# Patient Record
Sex: FEMALE | Race: WHITE | NOT HISPANIC OR LATINO | URBAN - METROPOLITAN AREA
[De-identification: names, ages, dates, MRNs, and addresses within clinical notes are randomized per-mention and may not be internally consistent; named-entity substitution may affect disease eponyms.]

---

## 2017-02-14 ENCOUNTER — EMERGENCY (EMERGENCY)
Facility: HOSPITAL | Age: 67
LOS: 1 days | Discharge: HOME | End: 2017-02-14
Attending: EMERGENCY MEDICINE
Payer: SELF-PAY

## 2017-02-14 VITALS
SYSTOLIC BLOOD PRESSURE: 139 MMHG | DIASTOLIC BLOOD PRESSURE: 63 MMHG | HEART RATE: 59 BPM | OXYGEN SATURATION: 95 % | HEIGHT: 61.42 IN | TEMPERATURE: 97 F | WEIGHT: 123.46 LBS | RESPIRATION RATE: 16 BRPM

## 2017-02-14 DIAGNOSIS — Z98.890 OTHER SPECIFIED POSTPROCEDURAL STATES: Chronic | ICD-10-CM

## 2017-02-14 DIAGNOSIS — R11.0 NAUSEA: ICD-10-CM

## 2017-02-14 DIAGNOSIS — M54.5 LOW BACK PAIN: ICD-10-CM

## 2017-02-14 DIAGNOSIS — R10.9 UNSPECIFIED ABDOMINAL PAIN: ICD-10-CM

## 2017-02-14 PROBLEM — Z00.00 ENCOUNTER FOR PREVENTIVE HEALTH EXAMINATION: Status: ACTIVE | Noted: 2017-02-14

## 2017-02-14 LAB
ALBUMIN SERPL ELPH-MCNC: 3.8 G/DL — SIGNIFICANT CHANGE UP (ref 3.5–5)
ALP SERPL-CCNC: 90 U/L — SIGNIFICANT CHANGE UP (ref 40–120)
ALT FLD-CCNC: 22 U/L DA — SIGNIFICANT CHANGE UP (ref 10–60)
ANION GAP SERPL CALC-SCNC: 8 MMOL/L — SIGNIFICANT CHANGE UP (ref 5–17)
APPEARANCE UR: CLEAR — SIGNIFICANT CHANGE UP
APTT BLD: 31.7 SEC — SIGNIFICANT CHANGE UP (ref 27.5–37.4)
AST SERPL-CCNC: 14 U/L — SIGNIFICANT CHANGE UP (ref 10–40)
BASOPHILS # BLD AUTO: 0.1 K/UL — SIGNIFICANT CHANGE UP (ref 0–0.2)
BASOPHILS NFR BLD AUTO: 1.2 % — SIGNIFICANT CHANGE UP (ref 0–2)
BILIRUB SERPL-MCNC: 0.3 MG/DL — SIGNIFICANT CHANGE UP (ref 0.2–1.2)
BILIRUB UR-MCNC: NEGATIVE — SIGNIFICANT CHANGE UP
BUN SERPL-MCNC: 16 MG/DL — SIGNIFICANT CHANGE UP (ref 7–18)
CALCIUM SERPL-MCNC: 8.6 MG/DL — SIGNIFICANT CHANGE UP (ref 8.4–10.5)
CHLORIDE SERPL-SCNC: 110 MMOL/L — HIGH (ref 96–108)
CO2 SERPL-SCNC: 27 MMOL/L — SIGNIFICANT CHANGE UP (ref 22–31)
COLOR SPEC: YELLOW — SIGNIFICANT CHANGE UP
CREAT SERPL-MCNC: 0.83 MG/DL — SIGNIFICANT CHANGE UP (ref 0.5–1.3)
DIFF PNL FLD: NEGATIVE — SIGNIFICANT CHANGE UP
EOSINOPHIL # BLD AUTO: 0.1 K/UL — SIGNIFICANT CHANGE UP (ref 0–0.5)
EOSINOPHIL NFR BLD AUTO: 1.9 % — SIGNIFICANT CHANGE UP (ref 0–6)
GLUCOSE SERPL-MCNC: 113 MG/DL — HIGH (ref 70–99)
GLUCOSE UR QL: NEGATIVE — SIGNIFICANT CHANGE UP
HCT VFR BLD CALC: 42.3 % — SIGNIFICANT CHANGE UP (ref 34.5–45)
HGB BLD-MCNC: 13.7 G/DL — SIGNIFICANT CHANGE UP (ref 11.5–15.5)
INR BLD: 0.95 RATIO — SIGNIFICANT CHANGE UP (ref 0.88–1.16)
KETONES UR-MCNC: NEGATIVE — SIGNIFICANT CHANGE UP
LEUKOCYTE ESTERASE UR-ACNC: NEGATIVE — SIGNIFICANT CHANGE UP
LIDOCAIN IGE QN: 185 U/L — SIGNIFICANT CHANGE UP (ref 73–393)
LYMPHOCYTES # BLD AUTO: 2.2 K/UL — SIGNIFICANT CHANGE UP (ref 1–3.3)
LYMPHOCYTES # BLD AUTO: 33.2 % — SIGNIFICANT CHANGE UP (ref 13–44)
MCHC RBC-ENTMCNC: 30.1 PG — SIGNIFICANT CHANGE UP (ref 27–34)
MCHC RBC-ENTMCNC: 32.4 GM/DL — SIGNIFICANT CHANGE UP (ref 32–36)
MCV RBC AUTO: 92.9 FL — SIGNIFICANT CHANGE UP (ref 80–100)
MONOCYTES # BLD AUTO: 0.3 K/UL — SIGNIFICANT CHANGE UP (ref 0–0.9)
MONOCYTES NFR BLD AUTO: 5.3 % — SIGNIFICANT CHANGE UP (ref 2–14)
NEUTROPHILS # BLD AUTO: 3.8 K/UL — SIGNIFICANT CHANGE UP (ref 1.8–7.4)
NEUTROPHILS NFR BLD AUTO: 58.4 % — SIGNIFICANT CHANGE UP (ref 43–77)
NITRITE UR-MCNC: NEGATIVE — SIGNIFICANT CHANGE UP
PH UR: 6 — SIGNIFICANT CHANGE UP (ref 4.8–8)
PLATELET # BLD AUTO: 279 K/UL — SIGNIFICANT CHANGE UP (ref 150–400)
POTASSIUM SERPL-MCNC: 4.3 MMOL/L — SIGNIFICANT CHANGE UP (ref 3.5–5.3)
POTASSIUM SERPL-SCNC: 4.3 MMOL/L — SIGNIFICANT CHANGE UP (ref 3.5–5.3)
PROT SERPL-MCNC: 7.3 G/DL — SIGNIFICANT CHANGE UP (ref 6–8.3)
PROT UR-MCNC: NEGATIVE — SIGNIFICANT CHANGE UP
PROTHROM AB SERPL-ACNC: 10.6 SEC — SIGNIFICANT CHANGE UP (ref 10–13.1)
RBC # BLD: 4.55 M/UL — SIGNIFICANT CHANGE UP (ref 3.8–5.2)
RBC # FLD: 12.1 % — SIGNIFICANT CHANGE UP (ref 10.3–14.5)
SODIUM SERPL-SCNC: 145 MMOL/L — SIGNIFICANT CHANGE UP (ref 135–145)
SP GR SPEC: 1.01 — SIGNIFICANT CHANGE UP (ref 1.01–1.02)
UROBILINOGEN FLD QL: NEGATIVE — SIGNIFICANT CHANGE UP
WBC # BLD: 6.6 K/UL — SIGNIFICANT CHANGE UP (ref 3.8–10.5)
WBC # FLD AUTO: 6.6 K/UL — SIGNIFICANT CHANGE UP (ref 3.8–10.5)

## 2017-02-14 PROCEDURE — 80053 COMPREHEN METABOLIC PANEL: CPT

## 2017-02-14 PROCEDURE — 74176 CT ABD & PELVIS W/O CONTRAST: CPT

## 2017-02-14 PROCEDURE — 36000 PLACE NEEDLE IN VEIN: CPT

## 2017-02-14 PROCEDURE — 99284 EMERGENCY DEPT VISIT MOD MDM: CPT

## 2017-02-14 PROCEDURE — 74176 CT ABD & PELVIS W/O CONTRAST: CPT | Mod: 26

## 2017-02-14 PROCEDURE — 85027 COMPLETE CBC AUTOMATED: CPT

## 2017-02-14 PROCEDURE — 85730 THROMBOPLASTIN TIME PARTIAL: CPT

## 2017-02-14 PROCEDURE — 81003 URINALYSIS AUTO W/O SCOPE: CPT

## 2017-02-14 PROCEDURE — 83690 ASSAY OF LIPASE: CPT

## 2017-02-14 PROCEDURE — 85610 PROTHROMBIN TIME: CPT

## 2017-02-14 NOTE — ED PROVIDER NOTE - NS ED MD SCRIBE ATTENDING SCRIBE SECTIONS
VITAL SIGNS( Pullset)/PAST MEDICAL/SURGICAL/SOCIAL HISTORY/PHYSICAL EXAM/REVIEW OF SYSTEMS/HISTORY OF PRESENT ILLNESS/DISPOSITION/HIV

## 2017-02-14 NOTE — ED PROVIDER NOTE - MEDICAL DECISION MAKING DETAILS
Pt w/ hematuria, flank, and lower abd pain. Labs and CT scan. Pt w/ hematuria, flank, and lower abd pain. Labs and CT scan. imaging normal. home with  urology followup

## 2017-02-14 NOTE — ED PROVIDER NOTE - OBJECTIVE STATEMENT
67 y/o F w/ PSHx of urological bladder surgery (4 years ago) p/w lower back pain onset 3 weeks ago associated w/ nausea, abd pain described as pressure, and intermittent hematuria. Son reports pt had flu-like Sx prior to Sx beginning. Pt went to PMD w/ UA negative and had 5-day Cipro course w/ no relief. Pt denies fever, chills, or any other complaint. NKDA.

## 2019-01-14 ENCOUNTER — INPATIENT (INPATIENT)
Facility: HOSPITAL | Age: 69
LOS: 0 days | Discharge: LEFT BEFORE DISCHARGE | DRG: 313 | End: 2019-01-15
Attending: INTERNAL MEDICINE | Admitting: INTERNAL MEDICINE
Payer: COMMERCIAL

## 2019-01-14 VITALS
OXYGEN SATURATION: 99 % | DIASTOLIC BLOOD PRESSURE: 78 MMHG | SYSTOLIC BLOOD PRESSURE: 157 MMHG | WEIGHT: 149.91 LBS | TEMPERATURE: 98 F | HEART RATE: 88 BPM | RESPIRATION RATE: 16 BRPM | HEIGHT: 63 IN
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
## 2019-01-14 DIAGNOSIS — R07.9 CHEST PAIN, UNSPECIFIED: ICD-10-CM

## 2019-01-14 DIAGNOSIS — E78.5 HYPERLIPIDEMIA, UNSPECIFIED: ICD-10-CM

## 2019-01-14 DIAGNOSIS — R40.20 UNSPECIFIED COMA: ICD-10-CM

## 2019-01-14 DIAGNOSIS — Z29.9 ENCOUNTER FOR PROPHYLACTIC MEASURES, UNSPECIFIED: ICD-10-CM

## 2019-01-14 DIAGNOSIS — Z98.890 OTHER SPECIFIED POSTPROCEDURAL STATES: Chronic | ICD-10-CM

## 2019-01-14 DIAGNOSIS — I10 ESSENTIAL (PRIMARY) HYPERTENSION: ICD-10-CM

## 2019-01-14 LAB
ALBUMIN SERPL ELPH-MCNC: 3.7 G/DL — SIGNIFICANT CHANGE UP (ref 3.5–5)
ALP SERPL-CCNC: 96 U/L — SIGNIFICANT CHANGE UP (ref 40–120)
ALT FLD-CCNC: 29 U/L DA — SIGNIFICANT CHANGE UP (ref 10–60)
ANION GAP SERPL CALC-SCNC: 7 MMOL/L — SIGNIFICANT CHANGE UP (ref 5–17)
APTT BLD: 31.1 SEC — SIGNIFICANT CHANGE UP (ref 27.5–36.3)
AST SERPL-CCNC: 18 U/L — SIGNIFICANT CHANGE UP (ref 10–40)
BASOPHILS # BLD AUTO: 0.1 K/UL — SIGNIFICANT CHANGE UP (ref 0–0.2)
BASOPHILS NFR BLD AUTO: 1.1 % — SIGNIFICANT CHANGE UP (ref 0–2)
BILIRUB SERPL-MCNC: 0.3 MG/DL — SIGNIFICANT CHANGE UP (ref 0.2–1.2)
BUN SERPL-MCNC: 13 MG/DL — SIGNIFICANT CHANGE UP (ref 7–18)
CALCIUM SERPL-MCNC: 8.5 MG/DL — SIGNIFICANT CHANGE UP (ref 8.4–10.5)
CHLORIDE SERPL-SCNC: 111 MMOL/L — HIGH (ref 96–108)
CO2 SERPL-SCNC: 25 MMOL/L — SIGNIFICANT CHANGE UP (ref 22–31)
CREAT SERPL-MCNC: 0.92 MG/DL — SIGNIFICANT CHANGE UP (ref 0.5–1.3)
EOSINOPHIL # BLD AUTO: 0.2 K/UL — SIGNIFICANT CHANGE UP (ref 0–0.5)
EOSINOPHIL NFR BLD AUTO: 2.8 % — SIGNIFICANT CHANGE UP (ref 0–6)
GLUCOSE SERPL-MCNC: 96 MG/DL — SIGNIFICANT CHANGE UP (ref 70–99)
HCT VFR BLD CALC: 42.6 % — SIGNIFICANT CHANGE UP (ref 34.5–45)
HGB BLD-MCNC: 13.8 G/DL — SIGNIFICANT CHANGE UP (ref 11.5–15.5)
LYMPHOCYTES # BLD AUTO: 2.5 K/UL — SIGNIFICANT CHANGE UP (ref 1–3.3)
LYMPHOCYTES # BLD AUTO: 32.6 % — SIGNIFICANT CHANGE UP (ref 13–44)
MCHC RBC-ENTMCNC: 30.2 PG — SIGNIFICANT CHANGE UP (ref 27–34)
MCHC RBC-ENTMCNC: 32.5 GM/DL — SIGNIFICANT CHANGE UP (ref 32–36)
MCV RBC AUTO: 93 FL — SIGNIFICANT CHANGE UP (ref 80–100)
MONOCYTES # BLD AUTO: 0.7 K/UL — SIGNIFICANT CHANGE UP (ref 0–0.9)
MONOCYTES NFR BLD AUTO: 9 % — SIGNIFICANT CHANGE UP (ref 2–14)
NEUTROPHILS # BLD AUTO: 4.2 K/UL — SIGNIFICANT CHANGE UP (ref 1.8–7.4)
NEUTROPHILS NFR BLD AUTO: 54.6 % — SIGNIFICANT CHANGE UP (ref 43–77)
PLATELET # BLD AUTO: 243 K/UL — SIGNIFICANT CHANGE UP (ref 150–400)
POTASSIUM SERPL-MCNC: 4.1 MMOL/L — SIGNIFICANT CHANGE UP (ref 3.5–5.3)
POTASSIUM SERPL-SCNC: 4.1 MMOL/L — SIGNIFICANT CHANGE UP (ref 3.5–5.3)
PROT SERPL-MCNC: 7.7 G/DL — SIGNIFICANT CHANGE UP (ref 6–8.3)
RBC # BLD: 4.58 M/UL — SIGNIFICANT CHANGE UP (ref 3.8–5.2)
RBC # FLD: 12.3 % — SIGNIFICANT CHANGE UP (ref 10.3–14.5)
SODIUM SERPL-SCNC: 143 MMOL/L — SIGNIFICANT CHANGE UP (ref 135–145)
TROPONIN I SERPL-MCNC: <0.015 NG/ML — SIGNIFICANT CHANGE UP (ref 0–0.04)
WBC # BLD: 7.7 K/UL — SIGNIFICANT CHANGE UP (ref 3.8–10.5)
WBC # FLD AUTO: 7.7 K/UL — SIGNIFICANT CHANGE UP (ref 3.8–10.5)

## 2019-01-14 PROCEDURE — 72125 CT NECK SPINE W/O DYE: CPT | Mod: 26

## 2019-01-14 PROCEDURE — 71100 X-RAY EXAM RIBS UNI 2 VIEWS: CPT | Mod: 26

## 2019-01-14 PROCEDURE — 99285 EMERGENCY DEPT VISIT HI MDM: CPT

## 2019-01-14 PROCEDURE — 70450 CT HEAD/BRAIN W/O DYE: CPT | Mod: 26

## 2019-01-14 PROCEDURE — 71046 X-RAY EXAM CHEST 2 VIEWS: CPT | Mod: 26

## 2019-01-14 RX ORDER — METOPROLOL TARTRATE 50 MG
1 TABLET ORAL
Qty: 0 | Refills: 0 | COMMUNITY

## 2019-01-14 RX ORDER — ACETAMINOPHEN 500 MG
975 TABLET ORAL ONCE
Qty: 0 | Refills: 0 | Status: COMPLETED | OUTPATIENT
Start: 2019-01-14 | End: 2019-01-14

## 2019-01-14 RX ORDER — METOPROLOL TARTRATE 50 MG
25 TABLET ORAL
Qty: 0 | Refills: 0 | Status: DISCONTINUED | OUTPATIENT
Start: 2019-01-14 | End: 2019-01-15

## 2019-01-14 RX ORDER — ASPIRIN/CALCIUM CARB/MAGNESIUM 324 MG
81 TABLET ORAL DAILY
Qty: 0 | Refills: 0 | Status: DISCONTINUED | OUTPATIENT
Start: 2019-01-14 | End: 2019-01-15

## 2019-01-14 RX ORDER — ATORVASTATIN CALCIUM 80 MG/1
40 TABLET, FILM COATED ORAL AT BEDTIME
Qty: 0 | Refills: 0 | Status: DISCONTINUED | OUTPATIENT
Start: 2019-01-14 | End: 2019-01-15

## 2019-01-14 RX ORDER — TETANUS TOXOID, REDUCED DIPHTHERIA TOXOID AND ACELLULAR PERTUSSIS VACCINE, ADSORBED 5; 2.5; 8; 8; 2.5 [IU]/.5ML; [IU]/.5ML; UG/.5ML; UG/.5ML; UG/.5ML
0.5 SUSPENSION INTRAMUSCULAR ONCE
Qty: 0 | Refills: 0 | Status: COMPLETED | OUTPATIENT
Start: 2019-01-14 | End: 2019-01-14

## 2019-01-14 RX ORDER — ENOXAPARIN SODIUM 100 MG/ML
40 INJECTION SUBCUTANEOUS DAILY
Qty: 0 | Refills: 0 | Status: DISCONTINUED | OUTPATIENT
Start: 2019-01-14 | End: 2019-01-15

## 2019-01-14 RX ADMIN — ENOXAPARIN SODIUM 40 MILLIGRAM(S): 100 INJECTION SUBCUTANEOUS at 22:41

## 2019-01-14 RX ADMIN — Medication 975 MILLIGRAM(S): at 19:43

## 2019-01-14 RX ADMIN — Medication 975 MILLIGRAM(S): at 17:26

## 2019-01-14 RX ADMIN — Medication 81 MILLIGRAM(S): at 22:41

## 2019-01-14 RX ADMIN — ATORVASTATIN CALCIUM 40 MILLIGRAM(S): 80 TABLET, FILM COATED ORAL at 22:40

## 2019-01-14 NOTE — H&P ADULT - PROBLEM SELECTOR PLAN 5
IMPROVE VTE score: 3  Will manage with: Lovenox S/C     [ ] Previous VTE                                    3  [ ] Thrombophilia                                  2  [ ] Lower limb paralysis                        2  (unable to hold up >15 seconds)    [ ] Current Cancer (within 6 months)        2   [x] Immobilization > 24 hrs                    1  [ ] ICU/CCU stay > 24 hrs                      1  [x] Age > 60                                         1

## 2019-01-14 NOTE — ED ADULT NURSE NOTE - ED STAT RN HANDOFF DETAILS 2
Handover report given to Nurse Roxanne. Patient awake and alert is being nurse don Telemetry Box. Awaiting bed availability.

## 2019-01-14 NOTE — ED PROVIDER NOTE - NS ED ROS FT
Constitutional: - Fever, - Chills, - unexplained weight loss  Eyes: - Discharge, - Irritation, - Redness, - Visual changes, - Light sensitivity  EARS: - Ear Pain, - hearing loss  NOSE: - Congestion, - epistaxis  MOUTH/THROAT: - Vocal Changes, - Drooling, - Sore throat  NECK: - Lumps, - Stiffness, - Pain  CV: - Palpitations, -+Chest Pain, - Edema   RESP:  - Cough, - Shortness of Breath, - Dyspnea on Exertion, - Wheezing  GI: - Diarrhea, - Constipation, - Bloody stools, - Nausea, - Vomiting, - Abdominal Pain  : - Dysuria, -Frequency, - Hematuria  MSK: - Myalgias, - focal weakness, + Injuries  SKIN: - Color change, + Rash  NEURO: - Change in behavior, - Dec. Alertness, +Headache, - Change in speech, - Seizure-like activity

## 2019-01-14 NOTE — ED PROVIDER NOTE - OBJECTIVE STATEMENT
68F h/o htn p/w trauma to head neck and right lateral ribs after mechanical trip and fall down several stairs hitting posterior head causing LOC. afterwards when pt was in ambulance pt had left sided chest pressure abnormal sensation with nausea and weakness that has now resolved. pt denies cardiac hx, prior cardiac workup, SOB, abd pain, back pain, extremity injury

## 2019-01-14 NOTE — ED ADULT NURSE NOTE - ED STAT RN HANDOFF DETAILS 5
Patient admitted to telemetry assigned to room 510, report given to STEVIE Gambino . Patient to be transported via stretcher by transporter , RN and cardiac monitor stable in no acute distress with all belongings safety maintained.

## 2019-01-14 NOTE — ED ADULT NURSE NOTE - OBJECTIVE STATEMENT
s/p trip and fall witnessed by co-worker, positive loc. No acute distress noted, EKG completed, denies chest pain, no shortness of breath indicated. Safety maintained.

## 2019-01-14 NOTE — H&P ADULT - ASSESSMENT
HPI: 68F from home with PMHx of HTN and HLD comes after having a mechanical fall after she slipped on her back yard. As per son at beside translating patient hit her self in the head, neck and right lateral ribs. He also refer that after the event she had LOC/ Afterwards when patient was in ambulance she  had left sided chest pressure abnormal sensation with nausea and weakness that has now resolved. As per ED attending patient also complained of chest pain that she currently denies.     Patient denies diarrhea, abdominal pain, SOB, cough, wheezing, joint pain or swelling, fever, chills.     ED course: Patient examined at bedside NAD  VS WNL  Imaging significant for CT of the head: Reversal of cervical lordosis. Mild loss of height of C5 and C6 vertebral  bodies likely chronic. Sagittal alignment intact.  There is no prevertebral soft tissue swelling. The odontoid is intact.  Multilevel degenerative changes noted resulting in multilevel spinal canal stenosis   and neural foraminal narrowing. No acute intracranial hemorrhage. No acute fracture cervical spine.    Patient will be admitted to the floor for Mechanical fall, LOC and chest pain after fall. HPI: 68F from home with PMHx of HTN and HLD comes after having a mechanical fall after she slipped on her back yard. As per son at beside translating patient hit her self in the head, neck and right lateral ribs. He also refer that after the event she had LOC/ Afterwards when patient was in ambulance she  had left sided chest pressure abnormal sensation with nausea and weakness that has now resolved. As per ED attending patient also complained of chest pain that she currently denies.     Patient denies diarrhea, abdominal pain, SOB, cough, wheezing, joint pain or swelling, fever, chills.     ED course: Patient examined at bedside NAD  VS WNL  Imaging significant for CT of the head: Reversal of cervical lordosis. Mild loss of height of C5 and C6 vertebral  bodies likely chronic. Sagittal alignment intact.  There is no prevertebral soft tissue swelling. The odontoid is intact.  Multilevel degenerative changes noted resulting in multilevel spinal canal stenosis   and neural foraminal narrowing. No acute intracranial hemorrhage. No acute fracture cervical spine.    Patient will be admitted to the floor for Mechanical fall, LOC and chest pain after fall.    [facility] [id number] [facility], [facility]

## 2019-01-14 NOTE — ED ADULT NURSE NOTE - ED STAT RN HANDOFF DETAILS 4
Received patient from RN Leandra admitted to telemetry with [id number] German speaking  # [id number] , patient c/o headache due to B/O Aleksandar NP made aware. Patient with right AC  20 patent and intact with ecchymotic area to right lower arm. No bed assigned as yet continue to monitor patient awaiting orders continue to monitor patient.

## 2019-01-14 NOTE — ED ADULT NURSE REASSESSMENT NOTE - NS ED NURSE REASSESS COMMENT FT1
Pt received from Migue moscoso from [facility], Pt axxo3, ambulatory independently. pt breathing on room air. Pt on tele box O. pt not in distress.

## 2019-01-14 NOTE — ED PROVIDER NOTE - PHYSICAL EXAMINATION
PE:   GEN: Awake, alert, oriented, interactive, NAD, well appearing.   HEAD: Atraumatic, normocephalic  EYES: Sclera white, conjunctiva pink, PERRLA  CARDIAC: Reg rate and rhythm, S1,S2, no murmur/rub/gallop. Strong central and peripheral pulses, Brisk cap refill, no evident pedal edema.   RESP: Normal respiratory rate and effort, no resp distress, lungs cta b/l without accessory muscle use, wheeze, rhonchi, or rales.   ABD: soft, non-tender, nondistended  NEURO: AOx3, CN II-XII grossly intact without focal deficits   MSK: Moving all extremities spontaneously, no apparent deformities, bruise on right forearm without bony tenderness, tenderness over inferior lateral right ribs, C6/C7 tenderness without deformity, no T/L/S spinal tenderness  SKIN: warm, dry

## 2019-01-14 NOTE — H&P ADULT - PROBLEM SELECTOR PLAN 2
Patient had a Mechanical Fall  Reversal of cervical lordosis. Mild loss of height of C5 and C6 vertebral  bodies likely chronic. Sagittal alignment intact.  There is no prevertebral soft tissue swelling. The odontoid is intact.  Multilevel degenerative changes noted resulting in multilevel spinal canal stenosis   and neural foraminal narrowing. No acute intracranial hemorrhage. No acute fracture cervical spine.

## 2019-01-14 NOTE — H&P ADULT - HISTORY OF PRESENT ILLNESS
68F from home with PMHx of HTN and HLD comes after having a mechanical fall after she slipped on her back yard. As per son at beside translating patient hit her self in the head, neck and right lateral ribs. He also refer that after the event she had LOC/ Afterwards when patient was in ambulance she  had left sided chest pressure abnormal sensation with nausea and weakness that has now resolved. Patient denies diarrhea, abdominal pain, SOB, cough, wheezing, joint pain or swelling, fever, chills.

## 2019-01-14 NOTE — H&P ADULT - PROBLEM SELECTOR PLAN 4
History of HTN  -Currently on Metoprolol 25 BID  -BP WNL upon evaluation C/W Lipitor 40 mg daily  Please check Pharmacy for home statin dose

## 2019-01-14 NOTE — H&P ADULT - PROBLEM SELECTOR PLAN 1
-EKG showed No ischemic Changes   Management:  - ASA, Lipitor + BB  - Check Troponins: T1 T2 and T3  - Telemetry monitoring  - TTE ordered  - Cardiology consult Dr Perez -EKG showed No ischemic Changes   Management:  - ASA, Lipitor + BB  - Check Troponins: T1 Negative T2 and T3  - Telemetry monitoring  - TTE ordered  - Cardiology consult Dr Perez

## 2019-01-15 ENCOUNTER — TRANSCRIPTION ENCOUNTER (OUTPATIENT)
Age: 69
End: 2019-01-15

## 2019-01-15 VITALS
DIASTOLIC BLOOD PRESSURE: 54 MMHG | TEMPERATURE: 98 F | OXYGEN SATURATION: 100 % | SYSTOLIC BLOOD PRESSURE: 118 MMHG | RESPIRATION RATE: 16 BRPM | HEART RATE: 62 BPM

## 2019-01-15 LAB
ANION GAP SERPL CALC-SCNC: 7 MMOL/L — SIGNIFICANT CHANGE UP (ref 5–17)
BASOPHILS # BLD AUTO: 0.1 K/UL — SIGNIFICANT CHANGE UP (ref 0–0.2)
BASOPHILS NFR BLD AUTO: 0.8 % — SIGNIFICANT CHANGE UP (ref 0–2)
BUN SERPL-MCNC: 14 MG/DL — SIGNIFICANT CHANGE UP (ref 7–18)
CALCIUM SERPL-MCNC: 8.3 MG/DL — LOW (ref 8.4–10.5)
CHLORIDE SERPL-SCNC: 111 MMOL/L — HIGH (ref 96–108)
CHOLEST SERPL-MCNC: 158 MG/DL — SIGNIFICANT CHANGE UP (ref 10–199)
CO2 SERPL-SCNC: 26 MMOL/L — SIGNIFICANT CHANGE UP (ref 22–31)
CREAT SERPL-MCNC: 0.82 MG/DL — SIGNIFICANT CHANGE UP (ref 0.5–1.3)
EOSINOPHIL # BLD AUTO: 0.2 K/UL — SIGNIFICANT CHANGE UP (ref 0–0.5)
EOSINOPHIL NFR BLD AUTO: 4 % — SIGNIFICANT CHANGE UP (ref 0–6)
GLUCOSE SERPL-MCNC: 81 MG/DL — SIGNIFICANT CHANGE UP (ref 70–99)
HBA1C BLD-MCNC: 5.9 % — HIGH (ref 4–5.6)
HCT VFR BLD CALC: 41.2 % — SIGNIFICANT CHANGE UP (ref 34.5–45)
HDLC SERPL-MCNC: 32 MG/DL — LOW
HGB BLD-MCNC: 13.3 G/DL — SIGNIFICANT CHANGE UP (ref 11.5–15.5)
LIPID PNL WITH DIRECT LDL SERPL: 72 MG/DL — SIGNIFICANT CHANGE UP
LYMPHOCYTES # BLD AUTO: 2.6 K/UL — SIGNIFICANT CHANGE UP (ref 1–3.3)
LYMPHOCYTES # BLD AUTO: 43.1 % — SIGNIFICANT CHANGE UP (ref 13–44)
MAGNESIUM SERPL-MCNC: 2 MG/DL — SIGNIFICANT CHANGE UP (ref 1.6–2.6)
MCHC RBC-ENTMCNC: 30.5 PG — SIGNIFICANT CHANGE UP (ref 27–34)
MCHC RBC-ENTMCNC: 32.3 GM/DL — SIGNIFICANT CHANGE UP (ref 32–36)
MCV RBC AUTO: 94.4 FL — SIGNIFICANT CHANGE UP (ref 80–100)
MONOCYTES # BLD AUTO: 0.5 K/UL — SIGNIFICANT CHANGE UP (ref 0–0.9)
MONOCYTES NFR BLD AUTO: 8.1 % — SIGNIFICANT CHANGE UP (ref 2–14)
NEUTROPHILS # BLD AUTO: 2.7 K/UL — SIGNIFICANT CHANGE UP (ref 1.8–7.4)
NEUTROPHILS NFR BLD AUTO: 44 % — SIGNIFICANT CHANGE UP (ref 43–77)
PHOSPHATE SERPL-MCNC: 4.5 MG/DL — SIGNIFICANT CHANGE UP (ref 2.5–4.5)
PLATELET # BLD AUTO: 233 K/UL — SIGNIFICANT CHANGE UP (ref 150–400)
POTASSIUM SERPL-MCNC: 4 MMOL/L — SIGNIFICANT CHANGE UP (ref 3.5–5.3)
POTASSIUM SERPL-SCNC: 4 MMOL/L — SIGNIFICANT CHANGE UP (ref 3.5–5.3)
RBC # BLD: 4.36 M/UL — SIGNIFICANT CHANGE UP (ref 3.8–5.2)
RBC # FLD: 12.6 % — SIGNIFICANT CHANGE UP (ref 10.3–14.5)
SODIUM SERPL-SCNC: 144 MMOL/L — SIGNIFICANT CHANGE UP (ref 135–145)
TOTAL CHOLESTEROL/HDL RATIO MEASUREMENT: 4.9 RATIO — SIGNIFICANT CHANGE UP (ref 3.3–7.1)
TRIGL SERPL-MCNC: 269 MG/DL — HIGH (ref 10–149)
TROPONIN I SERPL-MCNC: <0.015 NG/ML — SIGNIFICANT CHANGE UP (ref 0–0.04)
TROPONIN I SERPL-MCNC: <0.015 NG/ML — SIGNIFICANT CHANGE UP (ref 0–0.04)
TSH SERPL-MCNC: 2.31 UU/ML — SIGNIFICANT CHANGE UP (ref 0.34–4.82)
VIT B12 SERPL-MCNC: 325 PG/ML — SIGNIFICANT CHANGE UP (ref 232–1245)
WBC # BLD: 6.1 K/UL — SIGNIFICANT CHANGE UP (ref 3.8–10.5)
WBC # FLD AUTO: 6.1 K/UL — SIGNIFICANT CHANGE UP (ref 3.8–10.5)

## 2019-01-15 PROCEDURE — 93880 EXTRACRANIAL BILAT STUDY: CPT | Mod: 26

## 2019-01-15 RX ORDER — ASPIRIN/CALCIUM CARB/MAGNESIUM 324 MG
1 TABLET ORAL
Qty: 0 | Refills: 0 | COMMUNITY
Start: 2019-01-15

## 2019-01-15 RX ORDER — OXYCODONE AND ACETAMINOPHEN 5; 325 MG/1; MG/1
1 TABLET ORAL ONCE
Qty: 0 | Refills: 0 | Status: DISCONTINUED | OUTPATIENT
Start: 2019-01-15 | End: 2019-01-15

## 2019-01-15 RX ADMIN — Medication 0.1 MILLIGRAM(S): at 12:36

## 2019-01-15 RX ADMIN — ENOXAPARIN SODIUM 40 MILLIGRAM(S): 100 INJECTION SUBCUTANEOUS at 11:38

## 2019-01-15 RX ADMIN — OXYCODONE AND ACETAMINOPHEN 1 TABLET(S): 5; 325 TABLET ORAL at 06:05

## 2019-01-15 RX ADMIN — OXYCODONE AND ACETAMINOPHEN 1 TABLET(S): 5; 325 TABLET ORAL at 05:32

## 2019-01-15 RX ADMIN — Medication 81 MILLIGRAM(S): at 11:38

## 2019-01-15 RX ADMIN — Medication 25 MILLIGRAM(S): at 05:32

## 2019-01-15 RX ADMIN — Medication 25 MILLIGRAM(S): at 17:21

## 2019-01-15 NOTE — CONSULT NOTE ADULT - ASSESSMENT
68 yr old Female from home with PMHx of HTN and HLD comes after having a mechanical fall after she slipped on her back yard and abnormal EKG.  1.Tele monitoring.  2.Echocardiogram.  3.Orthostatic BP q shift.  4.Neurology eval.  5.HTN-cont bp medication.  6.Cont asa,statin.  7.GI and DVT prophylaxis.

## 2019-01-15 NOTE — DISCHARGE NOTE ADULT - PLAN OF CARE
remain stable change position slowly  fall precautions Low salt diet  Activity as tolerated.  Take all medication as prescribed.  Follow up with your medical doctor for routine blood pressure monitoring at your next visit.  Notify your doctor if you have any of the following symptoms:   Dizziness, Lightheadedness, Blurry vision, Headache, Chest pain, Shortness of breath

## 2019-01-15 NOTE — CHART NOTE - NSCHARTNOTEFT_GEN_A_CORE
pt and son eager to go home. not willing to wait for Echo result, refusing to wait for stress testing to be done. pt need stress test due to  Dr. Perez pt and pt's son eager to go home. not willing to wait for Echo result, refusing to wait for stress testing to be done. as per Dr. Perez  pt need stress test due report of syncope and collapse on admission. risks and benefits explained to pt and son in detail (see AMA form).  both verbalized understanding of risks and benefits. above discussed with Dr. Guajardo and Dr. Perez

## 2019-01-15 NOTE — PROGRESS NOTE ADULT - PROBLEM SELECTOR PLAN 3
-Takes Clonidine at home >20yrs   -'s likely rebound HTN, will restart Clonidine   -cont Metoprolol   -Mon BP

## 2019-01-15 NOTE — CONSULT NOTE ADULT - SUBJECTIVE AND OBJECTIVE BOX
CHIEF COMPLAINT:Patient is a 68y old  Female who presents with a chief complaint of Mechanical Fall and LOC.      HPI:  68 yr old Female from home with PMHx of HTN and HLD comes after having a mechanical fall after she slipped on her back yard. As per son at beside translating patient hit her self in the head, neck and right lateral ribs. He also refer that after the event she had LOC/ Afterwards when patient was in ambulance she  had left sided chest pressure abnormal sensation with nausea and weakness that has now resolved. Patient denies diarrhea, abdominal pain, SOB, cough, wheezing, joint pain or swelling, fever, chills.       PAST MEDICAL & SURGICAL HISTORY:  Hypertension  History of bladder surgery      MEDICATIONS  (STANDING):  aspirin  chewable 81 milliGRAM(s) Oral daily  atorvastatin 40 milliGRAM(s) Oral at bedtime  cloNIDine 0.1 milliGRAM(s) Oral three times a day  enoxaparin Injectable 40 milliGRAM(s) SubCutaneous daily  metoprolol tartrate 25 milliGRAM(s) Oral two times a day    MEDICATIONS  (PRN):      FAMILY HISTORY:No hx of CAD      SOCIAL HISTORY:    [x ] Non-smoker    [x ] Alcohol-denies    Allergies    No Known Allergies    Intolerances    	    REVIEW OF SYSTEMS:  CONSTITUTIONAL: No fever, weight loss, or fatigue  EYES: No eye pain, visual disturbances, or discharge  ENT:  No difficulty hearing, tinnitus, vertigo; No sinus or throat pain  NECK: No pain or stiffness  RESPIRATORY: No cough, wheezing, chills or hemoptysis; No Shortness of Breath  CARDIOVASCULAR: No chest pain, palpitations, + passing out  GASTROINTESTINAL: No abdominal or epigastric pain. No nausea, vomiting, or hematemesis; No diarrhea or constipation. No melena or hematochezia.  GENITOURINARY: No dysuria, frequency, hematuria, or incontinence  NEUROLOGICAL: No headaches, memory loss, loss of strength, numbness, or tremors  SKIN: No itching, burning, rashes, or lesions   LYMPH Nodes: No enlarged glands  ENDOCRINE: No heat or cold intolerance; No hair loss  MUSCULOSKELETAL: No joint pain or swelling; No muscle, back, or extremity pain  PSYCHIATRIC: No depression, anxiety, mood swings, or difficulty sleeping  HEME/LYMPH: No easy bruising, or bleeding gums  ALLERGY AND IMMUNOLOGIC: No hives or eczema	      PHYSICAL EXAM:  T(C): 36.8 (01-15-19 @ 11:15), Max: 36.9 (01-15-19 @ 08:00)  HR: 88 (01-15-19 @ 12:01) (52 - 88)  BP: 155/61 (01-15-19 @ 12:01) (132/76 - 156/64)  RR: 16 (01-15-19 @ 11:15) (16 - 20)  SpO2: 98% (01-15-19 @ 11:15) (95% - 98%)      Appearance: Normal	  HEENT:   Normal oral mucosa, PERRL, EOMI	  Lymphatic: No lymphadenopathy  Cardiovascular: Normal S1 S2, No JVD, No murmurs, No edema  Respiratory: Lungs clear to auscultation	  Psychiatry: A & O x 3, Mood & affect appropriate  Gastrointestinal:  Soft, Non-tender, + BS	  Skin: No rashes, No ecchymoses, No cyanosis	  Neurologic: Non-focal  Extremities: Normal range of motion, No clubbing, cyanosis or edema  Vascular: Peripheral pulses palpable 2+ bilaterally        ECG:  	sinus bradycardia with T wave inversion anterior leads    	  LABS:	 	    CARDIAC MARKERS:  CARDIAC MARKERS ( 15 Brice 2019 06:24 )  <0.015 ng/mL / x     / x     / x     / x      CARDIAC MARKERS ( 15 Brice 2019 02:47 )  <0.015 ng/mL / x     / x     / x     / x      CARDIAC MARKERS ( 14 Jan 2019 20:19 )  <0.015 ng/mL / x     / x     / x     / x                            13.3   6.1   )-----------( 233      ( 15 Brice 2019 06:24 )             41.2     01-15    144  |  111<H>  |  14  ----------------------------<  81  4.0   |  26  |  0.82    Ca    8.3<L>      15 Brice 2019 06:24  Phos  4.5     01-15  Mg     2.0     01-15    TPro  7.7  /  Alb  3.7  /  TBili  0.3  /  DBili  x   /  AST  18  /  ALT  29  /  AlkPhos  96  01-14      Lipid Profile: Cholesterol 158  LDL 72  HDL 32      HgA1c: Hemoglobin A1C, Whole Blood: 5.9 % (01-15 @ 09:20)    TSH: Thyroid Stimulating Hormone, Serum: 2.31 uU/mL (01-15 @ 06:24)       EXAM:  CT CERVICAL SPINE                          EXAM:  CT BRAIN                            PROCEDURE DATE:  01/14/2019          INTERPRETATION:  CLINICAL STATEMENT: Trauma..    TECHNIQUE: CT of the head and cervical spine were performed without IV   contrast. 3-D/MIP images obtained    COMPARISON: None.    FINDINGS:  Head:    There is no acute intracranial hemorrhage, parenchymal mass, mass effect   or midline shift. There is no acute territorial infarct. There is no   hydrocephalus.    The cranium is intact.         Mucosal thickening paranasal sinuses.    Cervical spine:  Reversal of cervical lordosis. Mild loss of height of C5 and C6 vertebral   bodies likely chronic. Sagittal alignment intact.    There is no prevertebral soft tissue swelling. The odontoid is intact.     Evaluation of the spinal canal is limited on a CT exam.  Multilevel   degenerative changes noted resulting in multilevel spinal canal stenosis   and neural foraminal narrowing.    IMPRESSION:  No acute intracranialhemorrhage.    No acute fracture cervical spine.                GERTRUDE BALBUENA M.D., ATTENDING RADIOLOGIST  This document has been electronically signed. Jan 14 2019  4:08PM        EXAM:  CT CERVICAL SPINE                          EXAM:  CT BRAIN                            PROCEDURE DATE:  01/14/2019          INTERPRETATION:  CLINICAL STATEMENT: Trauma..    TECHNIQUE: CT of the head and cervical spine were performed without IV   contrast. 3-D/MIP images obtained    COMPARISON: None.    FINDINGS:  Head:    There is no acute intracranial hemorrhage, parenchymal mass, mass effect   or midline shift. There is no acute territorial infarct. There is no   hydrocephalus.    The cranium is intact.         Mucosal thickening paranasal sinuses.    Cervical spine:  Reversal of cervical lordosis. Mild loss of height of C5 and C6 vertebral   bodies likely chronic. Sagittal alignment intact.    There is no prevertebral soft tissue swelling. The odontoid is intact.     Evaluation of the spinal canal is limited on a CT exam.  Multilevel   degenerative changes noted resulting in multilevel spinal canal stenosis   and neural foraminal narrowing.    IMPRESSION:  No acute intracranialhemorrhage.    No acute fracture cervical spine.                GERTRUDE BALBUENA M.D., ATTENDING RADIOLOGIST  This document has been electronically signed. Jan 14 2019  4:08PM

## 2019-01-15 NOTE — PROGRESS NOTE ADULT - PROBLEM SELECTOR PLAN 2
-s/p Mechanical Fall  -CT-Reversal of cervical lordosis. Mild loss of height of C5 and C6 vertebral  bodies likely chronic. Sagittal alignment intact. There is no prevertebral soft tissue swelling. The odontoid is intact.  Multilevel degenerative changes noted resulting in multilevel spinal canal stenosis and neural foraminal narrowing. No acute intracranial hemorrhage. No acute fracture cervical spine.  -check carotid dopplers

## 2019-01-15 NOTE — DISCHARGE NOTE ADULT - MEDICATION SUMMARY - MEDICATIONS TO TAKE
I will START or STAY ON the medications listed below when I get home from the hospital:    aspirin 81 mg oral tablet, chewable  -- 1 tab(s) by mouth once a day  -- Indication: For Prophylactic measure    cloNIDine 0.1 mg oral tablet  -- 1 tab(s) by mouth 3 times a day  -- Indication: For HTN (hypertension)    metoprolol tartrate 25 mg oral tablet  -- 1 tab(s) by mouth 2 times a day  -- Indication: For HTN (hypertension)

## 2019-01-15 NOTE — DISCHARGE NOTE ADULT - PATIENT PORTAL LINK FT
You can access the [facility] Patient Portal, offered by [facility], by registering with the following website: [email or web address]

## 2019-01-15 NOTE — PROGRESS NOTE ADULT - ASSESSMENT
68F from home with PMHx of HTN and HLD comes after having a mechanical fall after she slipped on her back yard. As per son  [id number] hit her self in the head, neck and right lateral ribs after falling down 6 stairs.Patient denies diarrhea, abdominal pain, SOB, cough, wheezing, joint pain or swelling, fever, chills. Imaging significant for CT of the head: Reversal of cervical lordosis. Mild loss of height of C5 and C6 vertebral  bodies likely chronic. Sagittal alignment intact.There is no prevertebral soft tissue swelling. The odontoid is intact.  Multilevel degenerative changes noted resulting in multilevel spinal canal stenosis and neural foraminal narrowing. No acute intracranial hemorrhage. No acute fracture cervical spine. admitted to medicine  for Mechanical fall, LOC and chest pain after fall.

## 2019-01-15 NOTE — DISCHARGE NOTE ADULT - HOSPITAL COURSE
68F from home with PMHx of HTN and HLD comes after having a mechanical fall after she slipped on her back yard. As per son  [id number] hit her self in the head, neck and right lateral ribs after falling down 6 stairs.Patient denies diarrhea, abdominal pain, SOB, cough, wheezing, joint pain or swelling, fever, chills. Imaging significant for CT of the head: Reversal of cervical lordosis. Mild loss of height of C5 and C6 vertebral  bodies likely chronic. Sagittal alignment intact.There is no prevertebral soft tissue swelling. The odontoid is intact.  Multilevel degenerative changes noted resulting in multilevel spinal canal stenosis and neural foraminal narrowing. No acute intracranial hemorrhage. No acute fracture cervical spine. admitted to medicine  for Mechanical fall, LOC and chest pain after fall. ACS ruled out, trop x3 negative. S/P tele monitoring- no arrythmia noted. pt refusing stress test, want to get it done outpt. d/w Dr. Guajardo

## 2019-01-15 NOTE — PROGRESS NOTE ADULT - PROBLEM SELECTOR PLAN 1
-EKG with no ischemic Changes   - cont  ASA, Lipitor and BB  - T1, T2 and T3 negative  - Telemetry monitoring  - check TTE

## 2019-01-15 NOTE — DISCHARGE NOTE ADULT - CARE PLAN
Principal Discharge DX:	Fall at home  Goal:	remain stable  Assessment and plan of treatment:	change position slowly  fall precautions  Secondary Diagnosis:	HTN (hypertension)  Assessment and plan of treatment:	Low salt diet  Activity as tolerated.  Take all medication as prescribed.  Follow up with your medical doctor for routine blood pressure monitoring at your next visit.  Notify your doctor if you have any of the following symptoms:   Dizziness, Lightheadedness, Blurry vision, Headache, Chest pain, Shortness of breath

## 2019-01-15 NOTE — PHYSICAL THERAPY INITIAL EVALUATION ADULT - GENERAL OBSERVATIONS, REHAB EVAL
pt aox4 cooperative with ed referral, emr review, post fall/joint pain, I adl portable stairs and ambulator

## 2019-01-15 NOTE — DISCHARGE NOTE ADULT - CARE PROVIDER_API CALL
Michael Guajardo), Internal Medicine  [address]  [place] [address]  Phone: [phone]  Fax: [phone]

## 2019-04-18 ENCOUNTER — TRANSCRIPTION ENCOUNTER (OUTPATIENT)
Age: 69
End: 2019-04-18

## 2019-04-24 PROCEDURE — 80048 BASIC METABOLIC PNL TOTAL CA: CPT

## 2019-04-24 PROCEDURE — 82962 GLUCOSE BLOOD TEST: CPT

## 2019-04-24 PROCEDURE — 86850 RBC ANTIBODY SCREEN: CPT

## 2019-04-24 PROCEDURE — 84100 ASSAY OF PHOSPHORUS: CPT

## 2019-04-24 PROCEDURE — 93306 TTE W/DOPPLER COMPLETE: CPT

## 2019-04-24 PROCEDURE — 85730 THROMBOPLASTIN TIME PARTIAL: CPT

## 2019-04-24 PROCEDURE — 83735 ASSAY OF MAGNESIUM: CPT

## 2019-04-24 PROCEDURE — 80053 COMPREHEN METABOLIC PANEL: CPT

## 2019-04-24 PROCEDURE — 86900 BLOOD TYPING SEROLOGIC ABO: CPT

## 2019-04-24 PROCEDURE — 86901 BLOOD TYPING SEROLOGIC RH(D): CPT

## 2019-04-24 PROCEDURE — 71100 X-RAY EXAM RIBS UNI 2 VIEWS: CPT

## 2019-04-24 PROCEDURE — 84484 ASSAY OF TROPONIN QUANT: CPT

## 2019-04-24 PROCEDURE — 72125 CT NECK SPINE W/O DYE: CPT

## 2019-04-24 PROCEDURE — 83036 HEMOGLOBIN GLYCOSYLATED A1C: CPT

## 2019-04-24 PROCEDURE — 82607 VITAMIN B-12: CPT

## 2019-04-24 PROCEDURE — 71046 X-RAY EXAM CHEST 2 VIEWS: CPT

## 2019-04-24 PROCEDURE — 80061 LIPID PANEL: CPT

## 2019-04-24 PROCEDURE — 85027 COMPLETE CBC AUTOMATED: CPT

## 2019-04-24 PROCEDURE — 99285 EMERGENCY DEPT VISIT HI MDM: CPT | Mod: 25

## 2019-04-24 PROCEDURE — 97161 PT EVAL LOW COMPLEX 20 MIN: CPT

## 2019-04-24 PROCEDURE — 93005 ELECTROCARDIOGRAM TRACING: CPT

## 2019-04-24 PROCEDURE — 93880 EXTRACRANIAL BILAT STUDY: CPT

## 2019-04-24 PROCEDURE — 70450 CT HEAD/BRAIN W/O DYE: CPT

## 2019-04-24 PROCEDURE — 84443 ASSAY THYROID STIM HORMONE: CPT

## 2019-04-24 PROCEDURE — 36415 COLL VENOUS BLD VENIPUNCTURE: CPT

## 2022-12-14 NOTE — ED PROVIDER NOTE - TEMPLATE, MLM
Abdominal Pain, N/V/D Picato Counseling:  I discussed with the patient the risks of Picato including but not limited to erythema, scaling, itching, weeping, crusting, and pain.

## 2023-09-21 NOTE — ED ADULT NURSE NOTE - CAS TRG GENERAL AIRWAY, MLM
Patients O2 saturation dropped to 83% on 5L O2. Patient switched to HFNC and oxygen upped to 8L. Patient O2 saturation now 91%.   Electronically signed by David Victor RN on 9/21/2023 at 3:22 AM Patent

## 2025-01-31 ENCOUNTER — EMERGENCY (EMERGENCY)
Facility: HOSPITAL | Age: 75
LOS: 1 days | Discharge: HOME | End: 2025-01-31
Attending: STUDENT IN AN ORGANIZED HEALTH CARE EDUCATION/TRAINING PROGRAM
Payer: MEDICARE

## 2025-01-31 VITALS
HEIGHT: 66 IN | HEART RATE: 59 BPM | TEMPERATURE: 99 F | RESPIRATION RATE: 16 BRPM | DIASTOLIC BLOOD PRESSURE: 60 MMHG | SYSTOLIC BLOOD PRESSURE: 98 MMHG | WEIGHT: 179.9 LBS | OXYGEN SATURATION: 98 %

## 2025-01-31 DIAGNOSIS — Z98.890 OTHER SPECIFIED POSTPROCEDURAL STATES: Chronic | ICD-10-CM

## 2025-01-31 LAB
ALBUMIN SERPL ELPH-MCNC: 3 G/DL — LOW (ref 3.5–5)
ALP SERPL-CCNC: 75 U/L — SIGNIFICANT CHANGE UP (ref 40–120)
ALT FLD-CCNC: 22 U/L DA — SIGNIFICANT CHANGE UP (ref 10–60)
ANION GAP SERPL CALC-SCNC: 5 MMOL/L — SIGNIFICANT CHANGE UP (ref 5–17)
APTT BLD: 29.8 SEC — SIGNIFICANT CHANGE UP (ref 24.5–35.6)
AST SERPL-CCNC: 22 U/L — SIGNIFICANT CHANGE UP (ref 10–40)
BASOPHILS # BLD AUTO: 0.03 K/UL — SIGNIFICANT CHANGE UP (ref 0–0.2)
BASOPHILS NFR BLD AUTO: 0.4 % — SIGNIFICANT CHANGE UP (ref 0–2)
BILIRUB SERPL-MCNC: 0.3 MG/DL — SIGNIFICANT CHANGE UP (ref 0.2–1.2)
BUN SERPL-MCNC: 27 MG/DL — HIGH (ref 7–18)
CALCIUM SERPL-MCNC: 8.6 MG/DL — SIGNIFICANT CHANGE UP (ref 8.4–10.5)
CHLORIDE SERPL-SCNC: 110 MMOL/L — HIGH (ref 96–108)
CO2 SERPL-SCNC: 24 MMOL/L — SIGNIFICANT CHANGE UP (ref 22–31)
CREAT SERPL-MCNC: 1.04 MG/DL — SIGNIFICANT CHANGE UP (ref 0.5–1.3)
EGFR: 57 ML/MIN/1.73M2 — LOW
EOSINOPHIL # BLD AUTO: 0.18 K/UL — SIGNIFICANT CHANGE UP (ref 0–0.5)
EOSINOPHIL NFR BLD AUTO: 2.4 % — SIGNIFICANT CHANGE UP (ref 0–6)
FLUAV AG NPH QL: SIGNIFICANT CHANGE UP
FLUBV AG NPH QL: SIGNIFICANT CHANGE UP
GLUCOSE SERPL-MCNC: 113 MG/DL — HIGH (ref 70–99)
HCT VFR BLD CALC: 25.6 % — LOW (ref 34.5–45)
HGB BLD-MCNC: 8.5 G/DL — LOW (ref 11.5–15.5)
IMM GRANULOCYTES NFR BLD AUTO: 0.5 % — SIGNIFICANT CHANGE UP (ref 0–0.9)
INR BLD: 0.93 RATIO — SIGNIFICANT CHANGE UP (ref 0.85–1.16)
LYMPHOCYTES # BLD AUTO: 1.39 K/UL — SIGNIFICANT CHANGE UP (ref 1–3.3)
LYMPHOCYTES # BLD AUTO: 18.7 % — SIGNIFICANT CHANGE UP (ref 13–44)
MCHC RBC-ENTMCNC: 30.7 PG — SIGNIFICANT CHANGE UP (ref 27–34)
MCHC RBC-ENTMCNC: 33.2 G/DL — SIGNIFICANT CHANGE UP (ref 32–36)
MCV RBC AUTO: 92.4 FL — SIGNIFICANT CHANGE UP (ref 80–100)
MONOCYTES # BLD AUTO: 0.53 K/UL — SIGNIFICANT CHANGE UP (ref 0–0.9)
MONOCYTES NFR BLD AUTO: 7.1 % — SIGNIFICANT CHANGE UP (ref 2–14)
NEUTROPHILS # BLD AUTO: 5.25 K/UL — SIGNIFICANT CHANGE UP (ref 1.8–7.4)
NEUTROPHILS NFR BLD AUTO: 70.9 % — SIGNIFICANT CHANGE UP (ref 43–77)
NRBC # BLD: 0 /100 WBCS — SIGNIFICANT CHANGE UP (ref 0–0)
NRBC BLD-RTO: 0 /100 WBCS — SIGNIFICANT CHANGE UP (ref 0–0)
PLATELET # BLD AUTO: 285 K/UL — SIGNIFICANT CHANGE UP (ref 150–400)
POTASSIUM SERPL-MCNC: 2.9 MMOL/L — CRITICAL LOW (ref 3.5–5.3)
POTASSIUM SERPL-SCNC: 2.9 MMOL/L — CRITICAL LOW (ref 3.5–5.3)
PROT SERPL-MCNC: 6.2 G/DL — SIGNIFICANT CHANGE UP (ref 6–8.3)
PROTHROM AB SERPL-ACNC: 10.8 SEC — SIGNIFICANT CHANGE UP (ref 9.9–13.4)
RBC # BLD: 2.77 M/UL — LOW (ref 3.8–5.2)
RBC # FLD: 13.7 % — SIGNIFICANT CHANGE UP (ref 10.3–14.5)
RSV RNA NPH QL NAA+NON-PROBE: SIGNIFICANT CHANGE UP
SARS-COV-2 RNA SPEC QL NAA+PROBE: SIGNIFICANT CHANGE UP
SODIUM SERPL-SCNC: 139 MMOL/L — SIGNIFICANT CHANGE UP (ref 135–145)
WBC # BLD: 7.42 K/UL — SIGNIFICANT CHANGE UP (ref 3.8–10.5)
WBC # FLD AUTO: 7.42 K/UL — SIGNIFICANT CHANGE UP (ref 3.8–10.5)

## 2025-01-31 PROCEDURE — 99285 EMERGENCY DEPT VISIT HI MDM: CPT

## 2025-01-31 RX ORDER — ONDANSETRON 4 MG/1
4 TABLET, ORALLY DISINTEGRATING ORAL ONCE
Refills: 0 | Status: COMPLETED | OUTPATIENT
Start: 2025-01-31 | End: 2025-01-31

## 2025-01-31 RX ORDER — POTASSIUM CHLORIDE 750 MG/1
10 TABLET, EXTENDED RELEASE ORAL
Refills: 0 | Status: COMPLETED | OUTPATIENT
Start: 2025-01-31 | End: 2025-02-01

## 2025-01-31 RX ORDER — ACETAMINOPHEN 160 MG/5ML
1000 SUSPENSION ORAL ONCE
Refills: 0 | Status: COMPLETED | OUTPATIENT
Start: 2025-01-31 | End: 2025-01-31

## 2025-01-31 RX ORDER — BACTERIOSTATIC SODIUM CHLORIDE 0.9 %
1000 VIAL (ML) INJECTION ONCE
Refills: 0 | Status: COMPLETED | OUTPATIENT
Start: 2025-01-31 | End: 2025-01-31

## 2025-01-31 RX ADMIN — POTASSIUM CHLORIDE 100 MILLIEQUIVALENT(S): 750 TABLET, EXTENDED RELEASE ORAL at 23:03

## 2025-01-31 RX ADMIN — ONDANSETRON 4 MILLIGRAM(S): 4 TABLET, ORALLY DISINTEGRATING ORAL at 20:53

## 2025-01-31 RX ADMIN — Medication 1000 MILLILITER(S): at 20:53

## 2025-01-31 RX ADMIN — POTASSIUM CHLORIDE 100 MILLIEQUIVALENT(S): 750 TABLET, EXTENDED RELEASE ORAL at 22:02

## 2025-01-31 RX ADMIN — ACETAMINOPHEN 400 MILLIGRAM(S): 160 SUSPENSION ORAL at 20:53

## 2025-01-31 NOTE — ED PROVIDER NOTE - PHYSICAL EXAMINATION
Exam:  General: Patient well appearing  HEENT: airway patent with moist mucous membranes  Cardiac: RRR S1/S2 with strong peripheral pulses  Respiratory: lungs clear without respiratory distress  GI: abdomen soft, non tender, non distended  Neuro: no gross neurologic deficits  Skin: ecchymosis throughout groin

## 2025-01-31 NOTE — ED PROVIDER NOTE - NSFOLLOWUPINSTRUCTIONS_ED_ALL_ED_FT
A hematoma is a bad bruise. It happens when an injury causes blood to collect and pool under the skin. The pooling blood gives the skin a spongy, rubbery, lumpy feel.    A hematoma usually is not a cause for concern. It is not the same thing as a blood clot in a vein, and it does not cause blood clots.    Rest and protect the bruised area.    Put ice or a cold pack on the area for 10 to 20 minutes at a time.    Prop up the bruised area on a pillow when you ice it or anytime you sit or lie down during the next 3 days. Try to keep it above the level of your heart. This will help reduce swelling.    Wrapping the bruised area with an elastic bandage such as an Ace wrap will help decrease swelling. Don't wrap it too tightly, as this can cause more swelling below the affected area.    Be safe with medicines. Read and follow all instructions on the label.    If the doctor gave you a prescription medicine for pain, take it as prescribed.    If you are not taking a prescription pain medicine, ask your doctor if you can take an over-the-counter medicine.

## 2025-01-31 NOTE — ED PROVIDER NOTE - CLINICAL SUMMARY MEDICAL DECISION MAKING FREE TEXT BOX
Patient presenting reporting generalized weakness malaise and nausea as well as bruising in her groin after recent cardiac cath.  Differential remains broad.  She has already followed up with her cardiologist who was not suspecting a pseudoaneurysm.  She is nontoxic-appearing despite her symptoms.  Plan for screening labs, flu test, EKG treat symptoms and reevaluate

## 2025-01-31 NOTE — ED PROVIDER NOTE - OBJECTIVE STATEMENT
74-year-old woman with a past medical history of hypertension, status post 2 stents placed at [place] 10 days ago presenting reporting generalized weakness, nausea, fevers 2 days ago also reporting severe pain in her groin, burning in nature.  Despite triage note she is denying chest pains to me at this time.  She started developing significant bruising in her groin after the sheath removal in the Cath Lab and has since followed up with her treating cardiologist who told her that this was a side effect of the procedure and that nothing could be done.  She has been taking aspirin at home without relief of symptoms.

## 2025-01-31 NOTE — ED PROVIDER NOTE - PATIENT PORTAL LINK FT
You can access the FollowMyHealth Patient Portal offered by [facility] by registering at the following website: [email or web address]. By joining [facility]’s FollowMyHealth portal, you will also be able to view your health information using other applications (apps) compatible with our system.

## 2025-01-31 NOTE — ED ADULT NURSE NOTE - NSFALLRISKINTERV_ED_ALL_ED

## 2025-01-31 NOTE — ED PROVIDER NOTE - PROGRESS NOTE DETAILS
LAZARUS DO:    74 female signed out pending repeat BMP and CBC.  Patient underwent PCI last month had bleeding at the site of insertion today.  No active bleeding today while in the ED.  Potassium noted to be low and repleted. LAZARUS DO:    Repeat hemoglobin stable.  CT shows no evidence of arterial injury.  Patient reporting burning pain likely from hematoma in the groin.  Instructed to apply ice and take Percocet as needed.  Discussed plan with son who agreed.  Patient reports she feels significantly better.  Instructed to return as needed.

## 2025-02-01 VITALS
HEART RATE: 60 BPM | RESPIRATION RATE: 16 BRPM | OXYGEN SATURATION: 98 % | DIASTOLIC BLOOD PRESSURE: 63 MMHG | SYSTOLIC BLOOD PRESSURE: 124 MMHG

## 2025-02-01 PROBLEM — I10 ESSENTIAL (PRIMARY) HYPERTENSION: Chronic | Status: ACTIVE | Noted: 2019-01-14

## 2025-02-01 LAB
ANION GAP SERPL CALC-SCNC: 6 MMOL/L — SIGNIFICANT CHANGE UP (ref 5–17)
BUN SERPL-MCNC: 24 MG/DL — HIGH (ref 7–18)
CALCIUM SERPL-MCNC: 7.2 MG/DL — LOW (ref 8.4–10.5)
CHLORIDE SERPL-SCNC: 118 MMOL/L — HIGH (ref 96–108)
CO2 SERPL-SCNC: 21 MMOL/L — LOW (ref 22–31)
CREAT SERPL-MCNC: 1.14 MG/DL — SIGNIFICANT CHANGE UP (ref 0.5–1.3)
EGFR: 51 ML/MIN/1.73M2 — LOW
GLUCOSE SERPL-MCNC: 95 MG/DL — SIGNIFICANT CHANGE UP (ref 70–99)
HCT VFR BLD CALC: 23.4 % — LOW (ref 34.5–45)
HCT VFR BLD CALC: 24.6 % — LOW (ref 34.5–45)
HGB BLD-MCNC: 7.5 G/DL — LOW (ref 11.5–15.5)
HGB BLD-MCNC: 7.5 G/DL — LOW (ref 11.5–15.5)
MCHC RBC-ENTMCNC: 29.6 PG — SIGNIFICANT CHANGE UP (ref 27–34)
MCHC RBC-ENTMCNC: 30.5 G/DL — LOW (ref 32–36)
MCV RBC AUTO: 97.2 FL — SIGNIFICANT CHANGE UP (ref 80–100)
NRBC # BLD: 0 /100 WBCS — SIGNIFICANT CHANGE UP (ref 0–0)
NRBC BLD-RTO: 0 /100 WBCS — SIGNIFICANT CHANGE UP (ref 0–0)
PLATELET # BLD AUTO: 255 K/UL — SIGNIFICANT CHANGE UP (ref 150–400)
POTASSIUM SERPL-MCNC: 3.7 MMOL/L — SIGNIFICANT CHANGE UP (ref 3.5–5.3)
POTASSIUM SERPL-SCNC: 3.7 MMOL/L — SIGNIFICANT CHANGE UP (ref 3.5–5.3)
RBC # BLD: 2.53 M/UL — LOW (ref 3.8–5.2)
RBC # FLD: 13.8 % — SIGNIFICANT CHANGE UP (ref 10.3–14.5)
SODIUM SERPL-SCNC: 145 MMOL/L — SIGNIFICANT CHANGE UP (ref 135–145)
WBC # BLD: 6.07 K/UL — SIGNIFICANT CHANGE UP (ref 3.8–10.5)
WBC # FLD AUTO: 6.07 K/UL — SIGNIFICANT CHANGE UP (ref 3.8–10.5)

## 2025-02-01 PROCEDURE — 93005 ELECTROCARDIOGRAM TRACING: CPT

## 2025-02-01 PROCEDURE — 85027 COMPLETE CBC AUTOMATED: CPT

## 2025-02-01 PROCEDURE — 36415 COLL VENOUS BLD VENIPUNCTURE: CPT

## 2025-02-01 PROCEDURE — 85025 COMPLETE CBC W/AUTO DIFF WBC: CPT

## 2025-02-01 PROCEDURE — 87637 SARSCOV2&INF A&B&RSV AMP PRB: CPT

## 2025-02-01 PROCEDURE — 80048 BASIC METABOLIC PNL TOTAL CA: CPT

## 2025-02-01 PROCEDURE — 96375 TX/PRO/DX INJ NEW DRUG ADDON: CPT

## 2025-02-01 PROCEDURE — 96376 TX/PRO/DX INJ SAME DRUG ADON: CPT

## 2025-02-01 PROCEDURE — 80053 COMPREHEN METABOLIC PANEL: CPT

## 2025-02-01 PROCEDURE — 99285 EMERGENCY DEPT VISIT HI MDM: CPT | Mod: 25

## 2025-02-01 PROCEDURE — 85014 HEMATOCRIT: CPT

## 2025-02-01 PROCEDURE — 85018 HEMOGLOBIN: CPT

## 2025-02-01 PROCEDURE — 96374 THER/PROPH/DIAG INJ IV PUSH: CPT

## 2025-02-01 PROCEDURE — 85730 THROMBOPLASTIN TIME PARTIAL: CPT

## 2025-02-01 PROCEDURE — 74174 CTA ABD&PLVS W/CONTRAST: CPT | Mod: MC

## 2025-02-01 PROCEDURE — 85610 PROTHROMBIN TIME: CPT

## 2025-02-01 PROCEDURE — 74174 CTA ABD&PLVS W/CONTRAST: CPT | Mod: 26

## 2025-02-01 RX ORDER — OXYCODONE HYDROCHLORIDE AND ACETAMINOPHEN 5; 325 MG/1; MG/1
1 TABLET ORAL
Qty: 12 | Refills: 0
Start: 2025-02-01 | End: 2025-02-03

## 2025-02-01 RX ADMIN — POTASSIUM CHLORIDE 100 MILLIEQUIVALENT(S): 750 TABLET, EXTENDED RELEASE ORAL at 00:24

## 2025-06-04 NOTE — ED ADULT TRIAGE NOTE - BP NONINVASIVE DIASTOLIC (MM HG)
Pt called to say she went to [facility] on 6/2/25 for a very bad sore throat. They swabbed her for strep (negative) and sent it out for culture (also negative). They told her its viral and that she has a canker sore. She is still complaining of a bad sore throat, a painful canker sore and excess saliva. Her big concern is her surgery on 6/30/25. Is there anything she can do to help this along quicker? She did make an appt this evening with Dr Lombardi but if she does not need to be seen please let her know.    
Pt having issues with swallowing  pt will be in tonight to see Dr Lombardi  klpn  
[id number]